# Patient Record
Sex: FEMALE | Race: AMERICAN INDIAN OR ALASKA NATIVE
[De-identification: names, ages, dates, MRNs, and addresses within clinical notes are randomized per-mention and may not be internally consistent; named-entity substitution may affect disease eponyms.]

---

## 2021-10-18 ENCOUNTER — HOSPITAL ENCOUNTER (OUTPATIENT)
Dept: HOSPITAL 5 - TRG | Age: 21
Discharge: HOME | End: 2021-10-18
Attending: OBSTETRICS & GYNECOLOGY
Payer: MEDICAID

## 2021-10-18 VITALS — SYSTOLIC BLOOD PRESSURE: 115 MMHG | DIASTOLIC BLOOD PRESSURE: 69 MMHG

## 2021-10-18 DIAGNOSIS — O46.8X2: Primary | ICD-10-CM

## 2021-10-18 DIAGNOSIS — Z3A.24: ICD-10-CM

## 2021-10-18 LAB
BILIRUB UR QL STRIP: (no result)
BLOOD UR QL VISUAL: (no result)
PH UR STRIP: 5 [PH] (ref 5–7)
PROT UR STRIP-MCNC: (no result) MG/DL
RBC #/AREA URNS HPF: 2 /HPF (ref 0–6)
UROBILINOGEN UR-MCNC: < 2 MG/DL (ref ?–2)
WBC #/AREA URNS HPF: 3 /HPF (ref 0–6)

## 2021-10-18 PROCEDURE — 81001 URINALYSIS AUTO W/SCOPE: CPT

## 2021-10-18 PROCEDURE — 59025 FETAL NON-STRESS TEST: CPT

## 2022-01-07 ENCOUNTER — HOSPITAL ENCOUNTER (OUTPATIENT)
Dept: HOSPITAL 5 - TRG | Age: 22
Discharge: HOME | End: 2022-01-07
Attending: OBSTETRICS & GYNECOLOGY
Payer: MEDICAID

## 2022-01-07 VITALS — SYSTOLIC BLOOD PRESSURE: 128 MMHG | DIASTOLIC BLOOD PRESSURE: 85 MMHG

## 2022-01-07 DIAGNOSIS — O26.893: ICD-10-CM

## 2022-01-07 DIAGNOSIS — M54.9: ICD-10-CM

## 2022-01-07 DIAGNOSIS — R19.7: ICD-10-CM

## 2022-01-07 DIAGNOSIS — O47.1: ICD-10-CM

## 2022-01-07 DIAGNOSIS — Z3A.36: ICD-10-CM

## 2022-01-07 PROCEDURE — 36415 COLL VENOUS BLD VENIPUNCTURE: CPT

## 2022-01-07 PROCEDURE — 96360 HYDRATION IV INFUSION INIT: CPT

## 2022-01-07 PROCEDURE — 84112 EVAL AMNIOTIC FLUID PROTEIN: CPT

## 2022-01-07 PROCEDURE — 59025 FETAL NON-STRESS TEST: CPT

## 2022-01-21 ENCOUNTER — HOSPITAL ENCOUNTER (INPATIENT)
Dept: HOSPITAL 5 - TRG | Age: 22
LOS: 3 days | Discharge: HOME | End: 2022-01-24
Attending: OBSTETRICS & GYNECOLOGY | Admitting: OBSTETRICS & GYNECOLOGY
Payer: MEDICAID

## 2022-01-21 DIAGNOSIS — Z3A.39: ICD-10-CM

## 2022-01-21 DIAGNOSIS — Z20.822: ICD-10-CM

## 2022-01-21 DIAGNOSIS — Z23: ICD-10-CM

## 2022-01-21 LAB
HCT VFR BLD CALC: 37.9 % (ref 30.3–42.9)
HGB BLD-MCNC: 12.1 GM/DL (ref 10.1–14.3)
MCHC RBC AUTO-ENTMCNC: 32 % (ref 30–34)
MCV RBC AUTO: 79 FL (ref 79–97)
PLATELET # BLD: 301 K/MM3 (ref 140–440)
RBC # BLD AUTO: 4.81 M/MM3 (ref 3.65–5.03)

## 2022-01-21 PROCEDURE — 86900 BLOOD TYPING SEROLOGIC ABO: CPT

## 2022-01-21 PROCEDURE — 83735 ASSAY OF MAGNESIUM: CPT

## 2022-01-21 PROCEDURE — 84550 ASSAY OF BLOOD/URIC ACID: CPT

## 2022-01-21 PROCEDURE — G0463 HOSPITAL OUTPT CLINIC VISIT: HCPCS

## 2022-01-21 PROCEDURE — 85027 COMPLETE CBC AUTOMATED: CPT

## 2022-01-21 PROCEDURE — 84450 TRANSFERASE (AST) (SGOT): CPT

## 2022-01-21 PROCEDURE — 76815 OB US LIMITED FETUS(S): CPT

## 2022-01-21 PROCEDURE — 99211 OFF/OP EST MAY X REQ PHY/QHP: CPT

## 2022-01-21 PROCEDURE — 82565 ASSAY OF CREATININE: CPT

## 2022-01-21 PROCEDURE — 85014 HEMATOCRIT: CPT

## 2022-01-21 PROCEDURE — 83615 LACTATE (LD) (LDH) ENZYME: CPT

## 2022-01-21 PROCEDURE — 84460 ALANINE AMINO (ALT) (SGPT): CPT

## 2022-01-21 PROCEDURE — 85018 HEMOGLOBIN: CPT

## 2022-01-21 PROCEDURE — 86592 SYPHILIS TEST NON-TREP QUAL: CPT

## 2022-01-21 PROCEDURE — 86901 BLOOD TYPING SEROLOGIC RH(D): CPT

## 2022-01-21 PROCEDURE — U0003 INFECTIOUS AGENT DETECTION BY NUCLEIC ACID (DNA OR RNA); SEVERE ACUTE RESPIRATORY SYNDROME CORONAVIRUS 2 (SARS-COV-2) (CORONAVIRUS DISEASE [COVID-19]), AMPLIFIED PROBE TECHNIQUE, MAKING USE OF HIGH THROUGHPUT TECHNOLOGIES AS DESCRIBED BY CMS-2020-01-R: HCPCS

## 2022-01-21 PROCEDURE — 86850 RBC ANTIBODY SCREEN: CPT

## 2022-01-21 PROCEDURE — 36415 COLL VENOUS BLD VENIPUNCTURE: CPT

## 2022-01-21 RX ADMIN — SODIUM CHLORIDE, SODIUM LACTATE, POTASSIUM CHLORIDE, AND CALCIUM CHLORIDE SCH MLS/HR: .6; .31; .03; .02 INJECTION, SOLUTION INTRAVENOUS at 22:28

## 2022-01-21 NOTE — HISTORY AND PHYSICAL REPORT
History of Present Illness


Date of examination: 22


Date of admission: 


22


Chief complaint: 


I'm having a lot of contractions and my water broke at 1 pm today and it was 

yellow. 





History of present illness: 


Pt presented to labor and delivery with c/o SROM for yellow fluid at 1pm and 

contractions that become more intense and frequent. 














EDC Confirmation:  2022


Gestational Age:  38.6 weeks on admission





Past Pregnancy History 


   :      1


   Term Births:      0


   Premature Births:   0


   Living Children:   0


   Para:         0


   Mult. Births:      0


   Prev :   0


   Prev.  attempt?   0


   Aborta:      0


   Elect. Ab:      0


   Spont. Ab:      0


   Ectopics:      0








Past Medical History:


   Reviewed and updated today:


      Negative Past Medical History





Past Surgical History:


   Reviewed and updated today:


      Negative Past Surgical History











Family History Summary: 


MGF - Has Family History of Prostate Cancer - Entered On: 2021


Mother - Has Family History of Hypertension - Entered On: 2021


Mother - Has Family History of Diabetes - Entered On: 2021








Social History:


   Single


   denies ETOH/smoking/drugs


   dog owner


   Works for [employer]








Risk Factors: 


Smoked Tobacco Use:  Never smoker


Smokeless Tobacco Use:  Never


Passive Smoke Exposure:  no


HIV High Risk Behavior:  no


Exercise:  no


Seatbelt Use:  50 %





No Dietary Counseling Reason: pn yes





Alcohol Use:  no





Drug Use:  no








Past Medical History 


Surgery (Non-gyn): Negative Past Surgical History





Abnormal PAP: negative


BARBARA Exposure: negative


Infertility: negative


Uterine Anomaly: negative


Uterine Surgery (not C/S): positive


Other Gynecologic Problems: negative





Social Hx: Single


denies ETOH/smoking/drugs


dog owner


Works for [employer]





Infection History 


Hx of STD: none


HIV Risk Eval: no


Hepatitis B Risk Eval: low risk


Personal hx. of genital herpes: no


Partner hx. of genital herpes: no





Genetic History 


 Congenital Heart Defect:


    Mom: no  Dad: no


Canavan Disease:


    Mom: no  Dad: no


Thalassemia


    Mom: no  Dad: no


Neural Tube Defect


    Mom: no  Dad: no


Down's Syndrome


    Mom: no  Dad: no


Luis A-Sachs


    Mom: no  Dad: no


Sickle Cell Disease/Trait


    Mom: no  Dad: no


Hemophilia


    Mom: no  Dad: no


Muscular Dystrophy


    Mom: no  Dad: no


Cystic Fibrosis


    Mom: no  Dad: no


[place] Chorea


    Mom: no  Dad: no


Mental Retardation


    Mom: no  Dad: no


Fragile X


    Mom: no  Dad: no


Other Genetic/Chromosomal Disorder


    Mom: no  Dad: no


Child w/other birth defect


    Mom: no  Dad: no





Enviromental Exposures 


Xray Exposure: no


Medication, drug, or alcohol use since LMP: no


Chemical/Other Exposure: no


Exposure to Cat Liter: no


Hx of Parvovirus (Fifth Disease): no


Occupational Exposure to Children: other


Comments:  works as 








Current Allergies (reviewed today):


No known allergies





     





Past History


Past Medical History: no pertinent history


Past Surgical History: no surgical history


Family/Genetic History: diabetes, hypertension, cancer


Social history: no significant social history





- Obstetrical History


Expected Date of Delivery: 22


Actual Gestation: 39 Week(s) 0 Day(s) 


: 1


Para: 0


Hx # Term Pregnancies: 0


Number of  Pregnancies: 0


Spontaneous Abortions: 0


Induced : 0


Number of Living Children: 0





Medications and Allergies


                                    Allergies











Allergy/AdvReac Type Severity Reaction Status Date / Time


 


No Known Allergies Allergy   Unverified 10/18/21 21:01











Active Meds: 


Active Medications





Acetaminophen (Acetaminophen 325 Mg Tab)  650 mg PO Q4H PRN


   PRN Reason: Pain, Mild (1-3)


Butorphanol Tartrate (Butorphanol 2 Mg/1 Ml Inj)  1 mg IV Q2H PRN


   PRN Reason: Pain, Moderate(4-6) LABOR PAIN


Carboprost Tromethamine (Carboprost Tromethamine 250 Mcg/1 Ml Inj)  250 mcg IM 

ONCE PRN


   PRN Reason: Uterine Bleeding


Ephedrine Sulfate (Ephedrine Sulfate 50 Mg/1 Ml Inj)  10 mg IV Q2M PRN


   PRN Reason: Hypotension


Fentanyl (Fentanyl 100 Mcg/2 Ml Inj)  100 mcg IV Q2H PRN


   PRN Reason: Pain,Severe (7-10) LABOR PAIN


Oxytocin/Sodium Chloride (Pitocin/Ns 30 Unit/500ml)  30 units in 500 mls @ 2 

mls/hr IV TITR JOSE; Protocol


Lactated Ringer's (Lactated Ringers)  1,000 mls @ 125 mls/hr IV AS DIRECT JOSE


Oxytocin/Sodium Chloride (Pitocin/Ns 30 Unit/500ml)  30 units in 500 mls @ 40 

mls/hr IV TITR JOSE; Protocol


Lidocaine (Lidocaine (2%) 20 Mg/1 Ml Vial 20 Ml Mdv)  20 ml INFILTRATI ONCE ONE


   Stop: 22 20:14


Loperamide HCl (Loperamide 2 Mg Cap)  2 mg PO ONCE PRN


   PRN Reason: give with Hemabate


Methylergonovine Maleate (Methylergonovine Maleate 0.2 Mg/Ml Vial)  0.2 mg IM 

ONCE PRN


   PRN Reason: Uterine Bleeding


Mineral Oil (Mineral Oil 30 Ml Oral Liqd)  30 ml PO QHS PRN


   PRN Reason: Constipation


Misoprostol (Misoprostol 200 Mcg Tab)  800 mcg LA ONCE PRN


   PRN Reason: Uterine Bleeding


Naloxone HCl (Naloxone 0.4 Mg/1 Ml Inj)  0.1 mg IV Q2MIN PRN


   PRN Reason: Res Rate </= 8 or 02 SAT < 92%


Ondansetron HCl (Ondansetron 4 Mg/2 Ml Inj)  4 mg IV Q8H PRN


   PRN Reason: Nausea And Vomiting


Oxytocin (Oxytocin 10 Unit/1 Ml Inj)  10 unit IM ONCE PRN


   PRN Reason: Uterine Bleeding


Promethazine HCl (Promethazine 25 Mg Tab)  25 mg PO Q6H PRN


   PRN Reason: Nausea And Vomiting


Terbutaline Sulfate (Terbutaline 1 Mg/1 Ml Inj)  0.25 mg SUB-Q ONCE PRN


   PRN Reason: Hyperstimulation/Hypertonicity











Review of Systems


All systems: negative





- Vital Signs


Vital signs: 


                                   Vital Signs











Pulse Pulse Ox


 


 91 H  99 


 


 22 20:03  22 20:03








                                        











Temp Pulse Resp BP Pulse Ox


 


    89      140/86   97 


 


    22 20:18     22 20:04  22 20:18








Large amount of light yellow fluid noted on peripad and exam glove. 





- Physical Exam


Breasts: Positive: deferred


Cardiovascular: Regular rate


Lungs: Positive: Normal air movement


Abdomen: Positive: normal appearance, soft


Genitourinary (Female): Positive: normal external genitalia, normal perenium


Vulva: both: normal


Uterus: Positive: normal size


Extremities: Positive: normal





- Obstetrical


FHR: category 1


Uterine Contraction Monitor Mode: External


Cervical Dilatation: 4


Cervical Effacement Percentage: 50


Fetal station: -2


Uterine Contraction Pattern: Regular


Uterine Tone Measurement Phase: Resting


Uterine Contraction Intensity: Moderate





Results


Result Diagrams: 


                                22 Unknown





All other labs normal.











GBS NEGATIVE


 HBsAg Screen              Negative                    Negative         *1


  RPR                       Non Reactive                Non Reactive     *2


 Rubella Antibodies, IgG


                            5.18 index                  Immune >0.99     *3


                                    Non-immune       <0.90


                                Equivocal  0.90 - 0.99


                                Immune           >0.99


   


  ABO Grouping              O                                            *4


  Rh Factor                 Positive                                     *5


    Please note: Prior records for this patient's ABO / Rh type are not


available for additional verification.


   


  Antibody Screen           Negative                    Negative         *6


  WBC                       9.0 x10E3/uL                3.4-10.8         *7


  RBC                       4.59 x10E6/uL               3.77-5.28        *8


  Hemoglobin                12.3 g/dL                   11.1-15.9        *9


  Hematocrit                38.2 %                      34.0-46.6        *10


  MCV                       83 fL                       79-97            *11


  MCH                       26.8 pg                     26.6-33.0        *12


  MCHC                      32.2 g/dL                   31.5-35.7        *13


  RDW                       13.5 %                      11.7-15.4        *14


  Platelets                 321 x10E3/uL                150-450          *15


  Neutrophils               66 %                        Not Estab.       *16


  Lymphs                    27 %                        Not Estab.       *17


  Monocytes                 7 %                         Not Estab.       *18


  Eos                       0 %                         Not Estab.       *19


  Basos                     0 %                         Not Estab.       *20


! Immature Cells            <No Reported Value>                          *21


 Neutrophils (Absolute)


                            5.9 x10E3/uL                1.4-7.0          *22


  Lymphs (Absolute)         2.4 x10E3/uL                0.7-3.1          *23


  Monocytes(Absolute)       0.7 x10E3/uL                0.1-0.9          *24


  Eos (Absolute)            0.0 x10E3/uL                0.0-0.4          *25


  Baso (Absolute)           0.0 x10E3/uL                0.0-0.2          *26


! Immature Granulocytes


                            0 %                         Not Estab.       *27


! Immature Grans (Abs)      0.0 x10E3/uL                0.0-0.1          *28


! NRBC                      <No Reported Value>                          *29


  Hematology Comments:      <No Reported Value>                          *30





Tests: (2) AFP Tetra (738702)


! Results                   Report                                       *31


! Test Results:             *Screen Negative*                            *32


! Gest. Age on Collection Date


                            20.0 WEEKS                                   *33


! Gestat. Age Based On      Ultrasound                                   *34


                                  20.0 on 2021


   


! M[age] KLAUDIA       [age] yr                                      *35


! Race                      Black                                        *36


! Weight                    180 lbs                                      *37


! Insulin Dep Diabetes      No                                           *38


! Multiple Gestation        No                                           *39


! AFP Value                 61.5 ng/mL                                   *40


! AFP MoM                   1.08                                         *41


! hCG Value                 7299 mIU/mL                                  *42


! hCG MoM                   0.34                                         *43


! uE3 Value                 2.00 ng/mL                                   *44


! uE3 MoM                   0.94                                         *45


! TINO Value                 100.82 pg/mL                                 *46


! TINO MoM                   0.62                                         *47


! OSBR Risk       1 IN      54476                                        *48


! DSR (Second Trimester) 1 IN


                            51461                                        *49


! DSR (By Age)    1 IN      1148                                         *50


! T18 Risk                  Not increased                                *51


! T18 (By Age)              1:4473                                       *52


! Interpretation            NL42                                         *53


    Interpretation: Screen Negative


This result is screen negative for fetal OSB, Down Syndrome and


Trisomy 18. The AFP MoM and patient specific risks calculated are


based on the gestational age and the clinical information provided.


This test can identify up to 80% of open fetal neural tube defects.


Closed neural tube defects and some open defects may not be detected


by this test.  The combination of maternal age, AFP, hCG, uE3, and


TINO identifies 75-80% of fetal Down Syndrome.  The combination of


maternal age, AFP, hCG and uE3 identifies 60% of Trisomy 18


pregnancies.  The American College of Obstetricians [employer] Gynec[employer]


recommends amniocentesis be offered to women age 35 and older.


Recalculations are not recommended when gestational dating by LMP and


ultrasound are within 10 days.


   


! Comments:                 [facility]                                        *54


    Aparna Sanchez, Ph.D., [facility]


Director


References: Available Upon Request.


Multiples Of Median Cutoffs     Abbreviation Definitions


    For AFP Elevations          IDD- Insulin Dep Diabetes


Finn  2.5   Black  2.8     OSBR- Open Spina Bifida


IDD        2.0   Twins  4.5            Risk


DSR Cutoff 1:270                DSR- Down Syndrome Risk


T18 Cutoff 1:100                T18- Trisomy 18


Down Syndrome and Trisomy 18 screening are considered


Investigational


For further inquiries contact [facility]


at [phone].


   





Tests: (3) Parvovirus B19, Human, IgG/IgM ([id number])


! Parvovirus B19, IgG       0.3 index                   0.0-0.8          *55


                                     Negative        <0.9


                                 Equivocal  0.9 - 1.1


                                 Positive        >1.1


   


! Parvovirus B19, IgM       0.1 index                   0.0-0.8          *56


                                     Negative        <0.9


                                 Equivocal  0.9 - 1.1


                                 Positive        >1.1


   





Tests: (4) HB Solu + Rflx Fra (117633)


 Hemoglobin (Hgb) Solubility


                            Negative                    Negative         *57





Tests: (5) HIV Ag/Ab with Reflex ([id number])


 HIV Screen 4th Generation wRfx


                            Non Reactive                Non Reactive     *58





Tests: (6) HCV Antibody reflex to JOSSELIN (170560)


  HCV Ab                    0.1 s/co ratio              0.0-0.9          *59





Tests: (7) Interpretation: ([id number])


! Interpretation:           SPRCS                                        *60


    Negative


Not infected with HCV, unless recent infection is suspected or other


evidence exists to indicate HCV infection.


   





Tests: (8) Urine Culture, Routine ([id number])


 Urine Culture, Routine


                            Final report                                 *61





Tests: (9) Result ([id number])


! Result 1                  No growth                                    *62


    








Assessment and Plan


A: 22 y.o. @ 38.6 wks on admission, SROM @ 1pm for light mec, labor. 








- Patient Problems


(1) 38 to 41 weeks gestation of pregnancy


Current Visit: Yes   Status: Acute   


Plan to address problem: 


Admit to labor and delivery.


 Initiate IV.


 Draw admission labs.


 Pain medication and epidural ordered if and when needed.


 Anticipate . 








(2) Thin meconium stained amniotic fluid


Current Visit: Yes   Status: Acute   


Plan to address problem: 


[facility] team at delivery.

## 2022-01-21 NOTE — ULTRASOUND REPORT
US OB LIMITED 



INDICATION:

Fetal presentation.



COMPARISON:

None available.



FINDINGS:

A single live intrauterine pregnancy is seen in cephalic presentation with a heart rate of 119 bpm. N
o acute findings are identified by limited ultrasound imaging.



IMPRESSION:

Single live intrauterine pregnancy without acute findings on this limited ultrasound.



Signer Name: Raoul Lawler MD 

Signed: 1/21/2022 9:29 PM

Workstation Name: [id number]

## 2022-01-22 LAB
ALT SERPL-CCNC: 12 UNITS/L (ref 7–56)
HCT VFR BLD CALC: 32.7 % (ref 30.3–42.9)
HCT VFR BLD CALC: 33.1 % (ref 30.3–42.9)
HGB BLD-MCNC: 10.2 GM/DL (ref 10.1–14.3)
HGB BLD-MCNC: 10.4 GM/DL (ref 10.1–14.3)
MCHC RBC AUTO-ENTMCNC: 32 % (ref 30–34)
MCV RBC AUTO: 78 FL (ref 79–97)
PLATELET # BLD: 271 K/MM3 (ref 140–440)
RBC # BLD AUTO: 4.27 M/MM3 (ref 3.65–5.03)
URATE SERPL-MCNC: 4.3 MG/DL (ref 3.5–7.6)

## 2022-01-22 PROCEDURE — 10907ZC DRAINAGE OF AMNIOTIC FLUID, THERAPEUTIC FROM PRODUCTS OF CONCEPTION, VIA NATURAL OR ARTIFICIAL OPENING: ICD-10-PCS | Performed by: OBSTETRICS & GYNECOLOGY

## 2022-01-22 RX ADMIN — DOCUSATE SODIUM SCH: 100 CAPSULE, LIQUID FILLED ORAL at 09:59

## 2022-01-22 RX ADMIN — Medication SCH: at 16:46

## 2022-01-22 RX ADMIN — SODIUM CHLORIDE, SODIUM LACTATE, POTASSIUM CHLORIDE, AND CALCIUM CHLORIDE SCH MLS/HR: .6; .31; .03; .02 INJECTION, SOLUTION INTRAVENOUS at 09:00

## 2022-01-22 RX ADMIN — IBUPROFEN SCH: 600 TABLET, FILM COATED ORAL at 09:59

## 2022-01-22 RX ADMIN — SODIUM CHLORIDE, SODIUM LACTATE, POTASSIUM CHLORIDE, AND CALCIUM CHLORIDE SCH MLS/HR: .6; .31; .03; .02 INJECTION, SOLUTION INTRAVENOUS at 01:15

## 2022-01-22 NOTE — EVENT NOTE
Date: 01/22/22


Was told by RN that patient's blood pressures have been 160-180's/'s since

delivery. Consulted with Dr. Parra. Will start magnesium infusion, draw pre

eclampsia labs. Discussed elevated blood pressures with patient and need for 

magnesium infusion, Hoyt Catheter placement, and lab work. Pt agrees to plan at

this time.

## 2022-01-22 NOTE — EVENT NOTE
Date: 22


AROM of forebag that looked clear at this time. Cervical exam 5.5//-2. 

Discussed with pain medication options discussed: epidural placement and IV 

medication.  Patient states that she is thinking about options. Anticipate .

## 2022-01-22 NOTE — PROCEDURE NOTE
OB Delivery Note





- Delivery


Date of Delivery: 22


Assistant: MUSA VILLEDA


Estimated blood loss: 200cc





- Vaginal


Delivery presentation: vertex


Delivery position: OA


Intrapartum events: meconium


Delivery induction: none


Delivery augmentation: pitocin


Delivery monitor: external FHT, external uterine


Route of delivery: 


Delivery placenta: spontaneous


Delivery cord: 3 umbilical vessels


Episiotomy: none


Delivery laceration: none


Anesthesia: intravenous (Fentanyl X1)


Delivery comments: 


 of viable male infant. Infant to mother abdomen for skin to skin. Cord 

clamped after cessation of pulse. Cut by FOC. Infant to warmer for [facility] team 

evaluation. Spontaneous delivery of placenta, intact, complete, 3 vessels noted.

Perineum and vagina inspected, no lacerations noted. Fundus firm, minimal 

bleeding noted. EBL 200ml. Apgars 8,9. Infant weight 5-15. Sponges and 

instruments counted X2 with RN and correct X2. Infant and mother left in stable 

condition in the care of the RN. 








- Infant


  ** A


Apgar at 1 minute: 8 ("Famous", Weight 5-15)


Apgar at 5 minutes: 9


Infant Gender: Male

## 2022-01-22 NOTE — PROGRESS NOTE
Assessment and Plan


 Pt resting with baby sleeping in bassinet at BS. S/o in room. Pt awakened to 

touch. denies HA/visual changes or epigastric pain. Lochia scant, fundus firm. 

H&H ordered for 1510. VSS currently normal, will continue to monitor closely.








- Patient Problems


(1)  (normal spontaneous vaginal delivery)


Current Visit: Yes   Status: Acute   


Plan to address problem: 


Continue postpartum pathway


Transfer to [facility] after 24hrs of mag sulfate therapy








(2) Pre-eclampsia


Current Visit: Yes   Status: Acute   


Plan to address problem: 


Mag x 24hrs post delivery


Strict I&O


Monitor for worsening symptoms


Labetalol 200mg PO BID








Subjective





- Subjective


Date of service: 22


Principal diagnosis: Postpartum day #0, ~12hrs from delivery, elevated b/p


Patient reports: appetite normal, pain well controlled, no nauseated


[place]: doing well





Objective





- Vital Signs


Latest vital signs: 


                                   Vital Signs











  Temp Pulse Resp BP Pulse Ox Pulse Ox


 


 22 13:05   83    100 


 


 22 13:00   97 H    100 


 


 22 12:55   103 H    99 


 


 22 12:51   90   110/67  


 


 22 12:50   97 H    100 


 


 22 12:45   95 H    100 


 


 22 12:40   95 H    100 


 


 22 12:35   101 H    100 


 


 22 12:30   96 H    100 


 


 22 12:25   107 H    100 


 


 22 12:20   107 H    100 


 


 22 12:15   110 H    100 


 


 22 12:10   100 H    100 


 


 22 12:05   101 H    100 


 


 22 12:00   108 H    100 


 


 22 11:55   97 H    100 


 


 22 11:50   99 H   119/57  100 


 


 22 11:45   97 H    100 


 


 22 11:40   80    100 


 


 22 11:35   108 H    100 


 


 22 11:30   101 H    100 


 


 22 11:25   86    100 


 


 22 11:20   100 H   139/63  100 


 


 22 11:15   101 H    100 


 


 22 11:10   99 H    100 


 


 22 11:05   102 H    100 


 


 22 11:00   104 H    100 


 


 22 10:55   96 H    100 


 


 22 10:50   96 H   123/70  100 


 


 22 10:45   98 H    100 


 


 22 10:43   97 H    94 


 


 22 10:40   97 H    99 


 


 22 10:35   93 H    99 


 


 22 10:30   108 H    100 


 


 22 10:25   102 H    100 


 


 22 10:20   107 H   140/85  100 


 


 22 10:15   91 H    100 


 


 22 10:10   95 H    100 


 


 22 10:05   93 H    100 


 


 22 10:00   111 H    100 


 


 22 09:55   93 H    100 


 


 22 09:50   101 H   130/61  100 


 


 22 09:45   95 H    100 


 


 22 09:40   99 H    100 


 


 22 09:35   95 H    100 


 


 22 09:30   84    100 


 


 22 09:25   86    100 


 


 22 09:20   89   120/77  100 


 


 22 09:15   91 H    100 


 


 22 09:10   94 H    100 


 


 22 09:05   101 H    100 


 


 22 09:00   105 H    100 


 


 22 08:55   96 H   125/82  100 


 


 22 08:50   103 H    100 


 


 22 08:45   108 H    100 


 


 22 08:40   107 H    100 


 


 22 08:35   118 H    100 


 


 22 08:30   116 H    100 


 


 22 08:25   110 H    100 


 


 22 08:20   114 H    100 


 


 22 08:15   114 H    100 


 


 22 08:10   118 H    100 


 


 22 08:05   116 H    100 


 


 22 08:00   115 H    100  99


 


 22 07:55   113 H    100 


 


 22 07:50   129 H    100 


 


 22 07:45   115 H    100 


 


 22 07:40   124 H    100 


 


 22 07:35   114 H    100 


 


 22 07:30   122 H    100 


 


 22 07:25   112 H    100 


 


 22 07:20   109 H   142/100  100 


 


 22 07:15   107 H    100 


 


 22 07:10   104 H    100 


 


 22 07:05   116 H    100 


 


 22 07:00   101 H    100 


 


 22 06:55   94 H    100 


 


 22 06:50  98.0 F  95 H  18  129/60  100 


 


 22 06:45   102 H    100 


 


 22 06:40   91 H    100 


 


 22 06:35   97 H    100 


 


 22 06:30   98 H    100 


 


 22 06:25   92 H    100 


 


 22 06:20   106 H   158/65  100 


 


 22 06:15   90    100 


 


 22 06:10   99 H    100 


 


 22 06:05   130 H    100 


 


 22 06:02   121 H   137/98  


 


 22 06:00  98.0 F  133 H  18   100 


 


 22 05:55   105 H    99 


 


 22 05:50   102 H   132/69  99 


 


 22 05:45   107 H    99 


 


 22 05:40   104 H    99 


 


 22 05:35   105 H    100 


 


 22 05:30   120 H    100 


 


 22 05:25   109 H    100 


 


 22 05:20   109 H   131/76  100 


 


 22 05:15   107 H    100 


 


 22 05:10   114 H    100 


 


 22 05:05   111 H    100 


 


 22 05:00  98.2 F  112 H  18   100 


 


 22 04:56   106 H   154/86  


 


 22 04:55   105 H    100 


 


 22 04:50   100 H   164/89  100 


 


 22 04:45   98 H    100 


 


 22 04:40   106 H    100 


 


 22 04:35   110 H    100 


 


 22 04:30   100 H    99 


 


 22 04:25   102 H    100 


 


 22 04:20   106 H    99 


 


 22 04:15   104 H   142/74  100 


 


 22 04:10   117 H    100 


 


 22 04:08   106 H   143/71  


 


 22 04:05   112 H    100 


 


 22 04:01   114 H   155/70  


 


 22 04:00  98.0 F  114 H  20   100 


 


 22 03:55   112 H    100 


 


 22 03:50   92 H    100 


 


 22 03:45   87   126/75  100 


 


 22 03:40   92 H    100 


 


 22 03:35   91 H    95 


 


 22 03:30   97 H   155/71  100 


 


 22 03:25   102 H    100 


 


 22 03:20   98 H    100 


 


 22 03:16   100 H   164/84  


 


 22 03:15   98 H    100 


 


 22 03:14    20   


 


 22 03:10   94 H    100 


 


 22 03:05   99 H    100 


 


 22 03:00   102 H   151/73  100 


 


 22 02:58   96 H   154/85  


 


 22 02:55   93 H   171/88  100 


 


 22 02:53   100 H   183/103  


 


 22 02:50   101 H    100 


 


 22 02:45   107 H   165/93  100 


 


 22 02:40   109 H    100 


 


 22 02:35  97.9 F  96 H   165/82  100 


 


 22 02:31   105 H   183/108  


 


 22 02:30   111 H    97 


 


 22 02:25   102 H    100 


 


 22 02:20   96 H    99 


 


 22 02:15   91 H    99 


 


 22 02:14    20   


 


 22 02:10   107 H    100 


 


 22 02:05   116 H    100 


 


 22 02:00   111 H    100 


 


 22 01:55   105 H    100 


 


 22 01:50   101 H    100 


 


 22 01:45   103 H    99 


 


 22 01:40   96 H    100 


 


 22 01:35   108 H    100 


 


 22 01:30   97 H    100 


 


 22 01:25   103 H    100 


 


 22 01:20   90    100 


 


 22 01:15   89    100 


 


 22 01:10   92 H    100 


 


 22 01:05   100 H    100 


 


 22 01:00   94 H    100 


 


 22 00:55   101 H    100 


 


 22 00:50   105 H    100 


 


 22 00:26   82    99 


 


 22 00:21   89    96 


 


 22 00:16   84    96 


 


 22 00:11   85    97 


 


 22 00:06   79    96 


 


 22 00:01   87    96 


 


 22 23:56   81    98 


 


 22 23:52      87 


 


 22 23:50   79    99 


 


 22 23:45   81    97 


 


 22 23:42      89 


 


 22 23:40   79    97 


 


 22 23:35   92 H    97 


 


 22 23:34   86    94 


 


 22 23:30   83    97 


 


 22 23:02   80    97 


 


 22 22:57   76    97 


 


 22 22:56   78   132/65  


 


 22 22:52      94 


 


 22 22:47   94 H    0 L 


 


 22 22:27   97 H    96 


 


 22 22:13   88    97 


 


 22 22:08   86    98 


 


 22 22:03   83    96 


 


 22 21:57   82    100 


 


 22 21:32   93 H    92 


 


 22 21:27   89    99 


 


 22 21:08   94 H    100 


 


 22 21:03   87    98 


 


 22 20:58   90    97 


 


 22 20:53   188 H    86 


 


 22 20:52   97 H    80 L 


 


 22 20:48   89    100 


 


 22 20:45   102 H    92 


 


 22 20:43   100 H    98 


 


 22 20:38   101 H    98 


 


 22 20:33   96 H    98 


 


 22 20:32   97 H    94 


 


 22 20:30  98.2 F     


 


 22 20:28   82    100 


 


 22 20:23   93 H    99 


 


 22 20:18   89    97 


 


 22 20:13   91 H    97 


 


 22 20:08   93 H    100 


 


 22 20:04   73   140/86  


 


 22 20:03   91 H    99 








                                Intake and Output











 22





 23:59 07:59 15:59


 


Intake Total 955.884 0614.133 429.467


 


Output Total  1450 550


 


Balance 552.034 -428.867 -120.533


 


Intake:   


 


  .143 5264.133 429.467


 


    Lactated Ringers 1,000 ml 811.219 0072.966 429.467





    @ 125 mls/hr IV AS   





    [facility] Rx#:[id number]   


 


    PITOCin/NS 30 UNIT/500ML 0.467 2.167 





    30 units In 500 ml @ 2   





    mls/hr IV TITR JOSE Rx#:   





    [id number]   


 


Output:   


 


  Urine  1450 550


 


    Indwelling Catheter   550


 


    Uretheral ([place])  300 


 


    Void  1150 


 


Other:   


 


  Total, Output Amount  150 550


 


  Weight 93.416 kg  


 


  Estimated Blood Loss  200 














- Exam


Breasts: Present: normal


Cardiovascular: Present: Regular rate


Lungs: Present: Normal air movement


Abdomen: Present: normal appearance, soft


Vulva: both: normal


Uterus: Present: normal, firm, fundal height below umbilicus


Extremities: Present: normal


Deep Tendon Reflex Grade: Normal +2





- Labs


Labs: 


                              Abnormal lab results











  22 Range/Units





  Unknown 04:13 04:13 


 


WBC   13.0 H   (4.5-11.0)  K/mm3


 


MCV   78 L   (79-97)  fl


 


MCH  25 L  24 L   (28-32)  pg


 


Creatinine    0.4 L  (0.6-1.2)  mg/dL


 


Magnesium     (1.7-2.3)  mg/dL


 


Lactate Dehydrogenase    183 H  ()  units/L














  22 Range/Units





  07:30 


 


WBC   (4.5-11.0)  K/mm3


 


MCV   (79-97)  fl


 


MCH   (28-32)  pg


 


Creatinine   (0.6-1.2)  mg/dL


 


Magnesium  2.80 H  (1.7-2.3)  mg/dL


 


Lactate Dehydrogenase   ()  units/L

## 2022-01-23 PROCEDURE — 3E0234Z INTRODUCTION OF SERUM, TOXOID AND VACCINE INTO MUSCLE, PERCUTANEOUS APPROACH: ICD-10-PCS | Performed by: OBSTETRICS & GYNECOLOGY

## 2022-01-23 RX ADMIN — DOCUSATE SODIUM SCH: 100 CAPSULE, LIQUID FILLED ORAL at 21:39

## 2022-01-23 RX ADMIN — Medication SCH: at 10:25

## 2022-01-23 RX ADMIN — IBUPROFEN SCH: 600 TABLET, FILM COATED ORAL at 16:00

## 2022-01-23 RX ADMIN — IBUPROFEN SCH: 600 TABLET, FILM COATED ORAL at 10:00

## 2022-01-23 RX ADMIN — IBUPROFEN SCH: 600 TABLET, FILM COATED ORAL at 21:39

## 2022-01-23 RX ADMIN — IBUPROFEN SCH: 600 TABLET, FILM COATED ORAL at 05:54

## 2022-01-23 RX ADMIN — DOCUSATE SODIUM SCH: 100 CAPSULE, LIQUID FILLED ORAL at 10:25

## 2022-01-23 NOTE — PROGRESS NOTE
Assessment and Plan


 Pt resting with baby sleeping in bassinet at BS. S/o in room. Pt awakened to 

touch. denies HA/visual changes or epigastric pain. Lochia scant, fundus firm. 

H&H 10.2/32.7. VSS currently normal, will continue to monitor closely.








- Patient Problems


(1)  (normal spontaneous vaginal delivery)


Current Visit: Yes   Status: Acute   


Plan to address problem: 


Continue postpartum pathway


Anticipate d/c home tomorrow if patient remains stable.








(2) Pre-eclampsia


Current Visit: Yes   Status: Acute   


Plan to address problem: 


Mag x 24hrs post delivery completed


Monitor for worsening symptoms


Labetalol 200mg PO BID








Subjective





- Subjective


Date of service: 22


Principal diagnosis: Postpartum day #1; s/p  and Mag for elevated b/p


Patient reports: appetite normal, voiding normally, pain well controlled, 

ambulating normally, no dizzy ambulation, no nauseated


: doing well





Objective





- Vital Signs


Latest vital signs: 


                                   Vital Signs











  Temp Pulse Resp BP BP Pulse Ox Pulse Ox


 


 22 04:15   77     100 


 


 22 04:10   71     100 


 


 22 04:05   76     100 


 


 22 04:00   80     100 


 


 22 03:55   82     100 


 


 22 03:50   88   113/65   100 


 


 22 03:45   75     100 


 


 22 03:40   75     100 


 


 22 03:35   74     100 


 


 22 03:30   93 H     100 


 


 22 03:25   85     100 


 


 22 03:20   92 H     99 


 


 22 03:15   73     100 


 


 22 03:10   73     100 


 


 22 03:05   99 H     100 


 


 22 03:01   79   118/70   


 


 22 03:00  97.4 F L  81     100 


 


 22 02:55   90     100 


 


 22 02:50   96 H     100 


 


 22 02:45   85     100 


 


 22 02:40   99 H     100 


 


 22 02:35   85     100 


 


 22 02:31   90   106/61   


 


 22 02:30   91 H     100 


 


 22 02:25   88     100 


 


 22 02:20   99 H     100 


 


 22 02:15   99 H     100 


 


 22 02:10   100 H     100 


 


 22 02:05   101 H     100 


 


 22 02:01   93 H   123/58   


 


 22 02:00   100 H     100 


 


 22 01:55   89     100 


 


 22 01:50   82     100 


 


 22 01:45   94 H     100 


 


 22 01:40   91 H     100 


 


 22 01:35   94 H     100 


 


 22 01:31   96 H   110/81   


 


 22 01:30   97 H     100 


 


 22 01:25   100 H     100 


 


 22 01:20   101 H     100 


 


 22 01:15   101 H     100 


 


 22 01:10   94 H     100 


 


 22 01:05   103 H     99 


 


 22 01:01   115 H   118/81   


 


 22 01:00   101 H     100 


 


 22 00:55   80     100 


 


 22 00:50   80     100 


 


 22 00:45   82     100 


 


 22 00:40   85     100 


 


 22 00:35   84     100 


 


 22 00:31   85   110/68   


 


 22 00:30   86     100 


 


 22 00:25   86     100 


 


 22 00:20   85     100 


 


 22 00:15   88     100 


 


 22 00:10   82     100 


 


 22 00:05   80     100 


 


 22 00:01   82   114/61   


 


 22 00:00   91 H     100 


 


 22 23:55   82     100 


 


 22 23:50   94 H     100 


 


 22 23:45   88     100 


 


 22 23:40   111 H     100 


 


 22 23:35   92 H     100 


 


 22 23:31   91 H   139/76   


 


 22 23:30   93 H     100 


 


 22 23:25   93 H     100 


 


 22 23:20   92 H     100 


 


 22 23:15   91 H     100 


 


 22 23:10   91 H     100 


 


 22 23:05   95 H     100 


 


 22 23:01   94 H   128/61   


 


 22 23:00   96 H     100 


 


 22 22:55   97 H     100 


 


 22 22:50   91 H     100 


 


 22 22:45   104 H     100 


 


 22 22:40   90     100 


 


 22 22:35   90     100 


 


 22 22:31   86   135/75   


 


 22 22:30   90     100 


 


 22 22:25   90     100 


 


 22 22:20   99 H     100 


 


 22 22:15   100 H     100 


 


 22 22:10   88     100 


 


 22 22:05   93 H     100 


 


 22 22:01   94 H   136/75   


 


 22 22:00   86     100 


 


 22 21:55   103 H     94 


 


 22 21:50   101 H   128/60   100 


 


 22 21:48   98 H   128/60   


 


 22 21:45   102 H     100 


 


 22 21:40   89     100 


 


 22 21:35   107 H     100 


 


 22 21:30   102 H     100 


 


 22 21:25   101 H     100 


 


 22 21:20   94 H     100 


 


 22 21:15   94 H     100 


 


 22 21:10   114 H     100 


 


 22 21:05   99 H     100 


 


 22 21:01   84   107/58   


 


 22 21:00   98 H     100 


 


 22 20:55   113 H     100 


 


 22 20:50   86     100 


 


 22 20:45   81     100 


 


 22 20:40   104 H     100 


 


 22 20:35   90     100 


 


 22 20:31   93 H   109/63   


 


 22 20:30   96 H     100 


 


 22 20:25   98 H     100 


 


 22 20:20   98 H     100 


 


 22 20:15   97 H     100 


 


 22 20:10   94 H     100 


 


 22 20:05   98 H     100 


 


 22 20:00   104 H     100 


 


 22 19:55   97 H     99 


 


 22 19:50   104 H     100 


 


 22 19:45   99 H     100 


 


 22 19:40   99 H     100 


 


 22 19:35   100 H     100 


 


 22 19:30   93 H     100 


 


 22 19:25   90     100 


 


 22 19:20   100 H     100 


 


 22 19:19        100


 


 22 19:15   97 H     100 


 


 22 19:10   92 H   118/67   97 


 


 22 19:08  98.4 F     188/67  


 


 22 19:06   100 H     85 


 


 22 19:05   108 H     97 


 


 22 19:00   100 H     97 


 


 22 18:55   98 H     97 


 


 22 18:50   103 H   113/76   98 


 


 22 18:45   109 H     99 


 


 22 18:40   101 H     100 


 


 22 18:39   102 H     93 


 


 22 18:35   106 H     99 


 


 22 18:30   97 H     100 


 


 22 18:25   96 H     99 


 


 22 18:24   101 H     84 


 


 22 18:23   97 H   123/58   


 


 22 18:20   96 H     100 


 


 22 18:15   98 H     100 


 


 22 18:10   67     100 


 


 22 18:05   106 H     100 


 


 22 18:00   103 H     100 


 


 22 17:55   109 H     100 


 


 22 17:50   99 H     100 


 


 22 17:45   104 H     100 


 


 22 17:40   113 H     98 


 


 22 17:35   112 H     100 


 


 22 17:30   102 H     99 


 


 22 17:27   111 H     94 


 


 22 17:25   57 L     97 


 


 22 17:20   110 H     100 


 


 22 17:15   106 H     100 


 


 22 17:10   104 H     100 


 


 22 17:05   112 H     100 


 


 22 17:00   108 H     100 


 


 22 16:55   103 H     99 


 


 22 16:50   97 H     100 


 


 22 16:49   53 L     92 


 


 22 16:45   99 H     100 


 


 22 16:40   95 H     100 


 


 22 16:35   104 H     99 


 


 22 16:30   107 H     100 


 


 22 16:25   109 H     100 


 


 22 16:20   111 H   135/70   100 


 


 22 16:15   106 H     100 


 


 22 16:10   89     99 


 


 22 16:05   83     100 


 


 22 16:00   99 H     100 


 


 22 15:55   87     100 


 


 22 15:50   88   123/76   100 


 


 22 15:45   96 H     100 


 


 22 15:40   92 H     100 


 


 22 15:35   91 H     100 


 


 22 15:30   89     100 


 


 22 15:25   88     100 


 


 22 15:20   89   136/75   100 


 


 22 15:15   78     100 


 


 22 15:10   91 H     100 


 


 22 15:05   84     100 


 


 22 15:00   90     100 


 


 22 14:55   89     100 


 


 22 14:50   93 H   129/69   100 


 


 22 14:45   91 H     100 


 


 22 14:40   92 H     100 


 


 22 14:35  98.1 F  107 H  16    100 


 


 22 14:30   88     100 


 


 22 14:25   92 H     100 


 


 22 14:21   84   117/53   


 


 22 14:20   86     100 


 


 22 14:15   89     100 


 


 22 14:10   89     100 


 


 22 14:05   95 H     100 


 


 22 14:00   94 H     100 


 


 22 13:55   87     99 


 


 22 13:50   85   124/77   99 


 


 22 13:45   97 H     99 


 


 22 13:40   86     99 


 


 22 13:35   103 H     99 


 


 22 13:30   106 H     99 


 


 22 13:25   93 H     99 


 


 22 13:20   89   120/56   100 


 


 22 13:15   86     100 


 


 22 13:10   86     100 


 


 22 13:05   83     100 


 


 22 13:00   97 H     100 


 


 22 12:55   103 H     99 


 


 22 12:51   90   110/67   


 


 22 12:50   97 H     100 


 


 22 12:45   95 H     100 


 


 22 12:40   95 H     100 


 


 22 12:35   101 H     100 


 


 22 12:30   96 H     100 


 


 22 12:25   107 H     100 


 


 22 12:20   107 H     100 


 


 22 12:15   110 H     100 


 


 22 12:10   100 H     100 


 


 22 12:05   101 H     100 


 


 22 12:00   108 H     100 


 


 22 11:55   97 H     100 


 


 22 11:50   99 H   119/57   100 


 


 22 11:45   97 H     100 


 


 22 11:40   80     100 


 


 22 11:35   108 H     100 


 


 22 11:30   101 H     100 


 


 22 11:25   86     100 


 


 22 11:20   100 H   139/63   100 


 


 22 11:15   101 H     100 


 


 22 11:10   99 H     100 


 


 22 11:05   102 H     100 


 


 22 11:00   104 H     100 


 


 22 10:55   96 H     100 


 


 22 10:50   96 H   123/70   100 


 


 22 10:45   98 H     100 


 


 22 10:43   97 H     94 


 


 22 10:40   97 H     99 


 


 22 10:35   93 H     99 


 


 22 10:30  98.2 F  108 H  16    99 


 


 22 10:25   102 H     100 


 


 22 10:20   107 H   140/85   100 


 


 22 10:15   91 H     100 


 


 22 10:10   95 H     100 


 


 22 10:05   93 H     100 


 


 22 10:00   111 H     100 


 


 22 09:55   93 H     100 


 


 22 09:50   101 H   130/61   100 


 


 22 09:45   95 H     100 


 


 22 09:40   99 H     100 


 


 22 09:35   95 H     100 


 


 22 09:30   84     100 


 


 22 09:25   86     100 


 


 22 09:20   89   120/77   100 


 


 22 09:15   91 H     100 


 


 22 09:10   94 H     100 


 


 22 09:05   101 H     100 


 


 22 09:00   105 H     100 


 


 22 08:55   96 H   125/82   100 


 


 22 08:50   103 H     100 


 


 22 08:45   108 H     100 


 


 22 08:40   107 H     100 


 


 22 08:35   118 H     100 


 


 22 08:30   116 H     100 


 


 22 08:25   110 H     100 


 


 22 08:20   114 H     100 


 


 22 08:15   114 H     100 


 


 22 08:10   118 H     100 


 


 22 08:05   116 H     100 


 


 22 08:00   115 H     100  99


 


 22 07:55   113 H     100 


 


 22 07:50   129 H     100 


 


 22 07:45   115 H     100 


 


 22 07:40   124 H     100 


 


 22 07:35   114 H     100 


 


 22 07:30   122 H     100 


 


 22 07:25   112 H     100 


 


 22 07:20   109 H   142/100   100 


 


 22 07:15   107 H     100 


 


 22 07:10   104 H     100 


 


 22 07:05   116 H     100 


 


 22 07:00   101 H     100 


 


 22 06:55   94 H     100 


 


 22 06:50  98.0 F  95 H  18  129/60   100 


 


 22 06:45   102 H     100 


 


 22 06:40   91 H     100 


 


 22 06:35   97 H     100 


 


 22 06:30   98 H     100 


 


 22 06:25   92 H     100 


 


 22 06:20   106 H   158/65   100 


 


 22 06:15   90     100 


 


 22 06:10   99 H     100 


 


 22 06:05   130 H     100 


 


 22 06:02   121 H   137/98   


 


 22 06:00  98.0 F  133 H  18    100 


 


 22 05:55   105 H     99 








                                Intake and Output











 22





 15:59 23:59 07:59


 


Intake Total 429.467  


 


Output Total 1150 1800 850


 


Balance -720.533 -1800 -850


 


Intake:   


 


  .467  


 


    Lactated Ringers 1,000 ml 429.467  





    @ 125 mls/hr IV AS   





    [facility] Rx#:[id number]   


 


Output:   


 


  Urine 1150 1800 850


 


    Indwelling Catheter 1150 1800 100


 


    Void   750


 


Other:   


 


  Total, Output Amount 600 400 500














- Exam


Breasts: Present: normal


Cardiovascular: Present: Regular rate


Lungs: Present: Clear to auscultation, Normal air movement


Abdomen: Present: normal appearance, soft


Vulva: both: normal


Uterus: Present: normal, firm, fundal height below umbilicus


Extremities: Present: normal


Deep Tendon Reflex Grade: Normal +2





- Labs


Labs: 


                              Abnormal lab results











  22 Range/Units





  07:30 15:57 22:20 


 


Magnesium  2.80 H  3.50 H  3.20 H  (1.7-2.3)  mg/dL

## 2022-01-24 VITALS — DIASTOLIC BLOOD PRESSURE: 64 MMHG | SYSTOLIC BLOOD PRESSURE: 129 MMHG

## 2022-01-24 RX ADMIN — IBUPROFEN SCH: 600 TABLET, FILM COATED ORAL at 05:53

## 2022-01-24 NOTE — DISCHARGE SUMMARY
Providers





- Providers


Date of Admission: 


22 20:13





Date of discharge: 22 (pt desires discharge home)


Attending physician: 


TERESE CHISHOLM





Primary care physician: 


TERESE CHISHOLM








Hospitalization


Reason for admission: rupture of membranes, IUP at term


Delivery: 


Episiotomy: none


Laceration: none


Other postpartum procedures: none


Postpartum complications: none


Discharge diagnosis: IUP at term delivered


[place] baby: male


Condition at discharge: Good


Disposition:  HOME / SELF CARE / HOMELESS





Plan





- Discharge Medications


Prescriptions: 


Lidocain2.5%/Prilocai2.5% [Emla] 1 applic TP ONCE #1 tube


labetaloL [Labetalol 200mg TAB] 200 mg PO BID #60 tablet





- Provider Discharge Summary


Activity: routine, no sex for 6 weeks, no heavy lifting 4 weeks, no strenuous 

exercise


Diet: routine


Instructions: routine


Additional instructions: 


[]  Smoking cessation referral if applicable(refer to patient education folder 

for contact #)


[]  Refer to [facility]'s [facility] Booklet








Call your doctor immediately for:


* Fever > 100.5


* Heavy vaginal bleeding ( >1 pad per hour)


* Severe persistent headache


* Shortness of breath


* Reddened, hot, painful area to leg or breast





Congratulations! Please call [phone] and schedule your postpartum visit 

with blood pressure check in 1 week. Please also schedule your 's 

elective circumcision in 1 week, as desired. Do not apply the prescription cream

 before the procedure. Please bring it with you to your appointment. Thank you!











- Follow up plan


Follow up: 


TERESE CHISHOLM MD [Primary Care Provider] - 7 Days